# Patient Record
Sex: FEMALE | Race: WHITE | NOT HISPANIC OR LATINO | Employment: PART TIME | URBAN - METROPOLITAN AREA
[De-identification: names, ages, dates, MRNs, and addresses within clinical notes are randomized per-mention and may not be internally consistent; named-entity substitution may affect disease eponyms.]

---

## 2023-08-21 ENCOUNTER — HOSPITAL ENCOUNTER (OUTPATIENT)
Dept: DATA CONVERSION | Facility: HOSPITAL | Age: 63
End: 2023-08-21

## 2023-08-21 DIAGNOSIS — Z12.31 ENCOUNTER FOR SCREENING MAMMOGRAM FOR MALIGNANT NEOPLASM OF BREAST: ICD-10-CM

## 2023-08-22 PROBLEM — R73.9 HYPERGLYCEMIA: Status: ACTIVE | Noted: 2023-08-22

## 2023-08-22 PROBLEM — E55.9 VITAMIN D DEFICIENCY: Status: ACTIVE | Noted: 2023-08-22

## 2023-08-22 PROBLEM — J30.2 SEASONAL ALLERGIES: Status: ACTIVE | Noted: 2023-08-22

## 2023-08-22 PROBLEM — E03.8 SUBCLINICAL HYPOTHYROIDISM: Status: ACTIVE | Noted: 2023-08-22

## 2023-08-22 PROBLEM — E78.2 MIXED HYPERLIPIDEMIA: Status: ACTIVE | Noted: 2023-08-22

## 2023-08-22 PROBLEM — R55 NEAR SYNCOPE: Status: ACTIVE | Noted: 2023-08-22

## 2023-08-22 RX ORDER — FLUTICASONE PROPIONATE 50 MCG
1 SPRAY, SUSPENSION (ML) NASAL DAILY
COMMUNITY

## 2023-08-22 RX ORDER — PRAVASTATIN SODIUM 40 MG/1
1 TABLET ORAL DAILY
COMMUNITY

## 2023-08-22 RX ORDER — TURMERIC ROOT EXTRACT 500 MG
TABLET ORAL
COMMUNITY

## 2023-08-22 RX ORDER — ACETAMINOPHEN 500 MG
1 TABLET ORAL DAILY
COMMUNITY

## 2023-08-22 RX ORDER — LORATADINE 10 MG/1
1 TABLET ORAL DAILY
COMMUNITY

## 2023-08-22 RX ORDER — IBUPROFEN 100 MG/5ML
1 SUSPENSION, ORAL (FINAL DOSE FORM) ORAL DAILY
COMMUNITY

## 2023-10-09 NOTE — PROGRESS NOTES
Subjective   Patient ID: Tabatha Grewal is a 62 y.o. female who presents for yearly exam.    Complaining of vaginal bleeding since yesterday.  Stated symptoms resolved today.  She has been applying flagyl after she saw Dr Liao few weeks ago for pH imbalance.  Denied any foul smelling vaginal discharge.  Complaining of lower abdominal cramps.  Denied any burning micturition, hematuria.    HPI               H/O dyslipidemia-compliant with medications. Denied any medication side effects.           Review of Systems   Constitutional:  Negative for chills, fatigue and unexpected weight change.   HENT:  Negative for postnasal drip, sinus pressure and trouble swallowing.    Respiratory:  Negative for cough, shortness of breath and wheezing.    Cardiovascular:  Negative for chest pain, palpitations and leg swelling.   Gastrointestinal:  Negative for abdominal pain, blood in stool, nausea and vomiting.   Endocrine: Negative for polydipsia, polyphagia and polyuria.   Genitourinary:  Negative for dysuria and frequency.   Musculoskeletal:  Positive for back pain (wearing support brace helps). Negative for myalgias.   Skin:  Negative for rash.   Neurological:  Negative for tremors, seizures and numbness.   Psychiatric/Behavioral:  Negative for behavioral problems.        Objective   /76 (BP Location: Left arm, Patient Position: Sitting, BP Cuff Size: Adult)   Pulse 67   Temp 36.1 °C (97 °F) (Temporal)   Wt 62.6 kg (138 lb)   SpO2 97%   BMI 24.84 kg/m²     Physical Exam  Constitutional:       General: She is not in acute distress.     Appearance: Normal appearance.   HENT:      Head: Normocephalic and atraumatic.      Right Ear: Tympanic membrane normal.      Left Ear: Tympanic membrane normal.   Eyes:      Extraocular Movements: Extraocular movements intact.      Pupils: Pupils are equal, round, and reactive to light.   Cardiovascular:      Rate and Rhythm: Normal rate and regular rhythm.      Heart sounds:  Normal heart sounds. No murmur heard.     No friction rub.   Pulmonary:      Effort: Pulmonary effort is normal.      Breath sounds: Normal breath sounds. No wheezing or rales.   Abdominal:      General: Bowel sounds are normal.      Palpations: Abdomen is soft.      Tenderness: There is no abdominal tenderness. There is no guarding.      Comments: Umbilical hernia+, non tender   Musculoskeletal:         General: Normal range of motion.      Cervical back: Normal range of motion and neck supple.      Right lower leg: No edema.      Left lower leg: No edema.   Skin:     General: Skin is warm and dry.      Findings: No rash.   Neurological:      General: No focal deficit present.      Mental Status: She is alert and oriented to person, place, and time.      Cranial Nerves: No cranial nerve deficit.      Sensory: No sensory deficit.   Psychiatric:         Mood and Affect: Mood normal.         Assessment/Plan       Tabatha was seen today for annual exam.  Diagnoses and all orders for this visit:  Annual physical exam (Primary)  -     CBC and Auto Differential; Future  -     Comprehensive Metabolic Panel; Future  Hyperglycemia  -     Cancel: Hemoglobin A1C; Future  -     Hemoglobin A1C; Standing  Subclinical hypothyroidism  -     TSH with reflex to Free T4 if abnormal; Future  Vitamin D deficiency  -     Vitamin D 25-Hydroxy,Total (for eval of Vitamin D levels); Future  Mixed hyperlipidemia  -     Lipid Panel; Future  Bilateral lower abdominal cramping  -     Urine Culture; Future  -     Urinalysis with Reflex Microscopic; Future  Vaginal bleeding  -     US pelvis transvaginal; Future  Other orders  -     Flu vaccine (IIV4) age 6 months and greater, preservative free     Current Outpatient Medications   Medication Instructions    ascorbic acid (Vitamin C) 1,000 mg tablet 1 tablet, oral, Daily    cholecalciferol (Vitamin D3) 50 mcg (2,000 unit) capsule 1 capsule, oral, Daily    fluticasone (Flonase) 50 mcg/actuation  nasal spray 1 spray, Each Nostril, Daily    loratadine (Claritin) 10 mg tablet 1 tablet, oral, Daily    MULTIVITAMIN ORAL oral    pravastatin (Pravachol) 40 mg tablet 1 tablet, oral, Daily, For 90 days.    turmeric root extract 500 mg tablet oral, As directed.     vitamin B complex (B COMPLEX-VITAMIN B12 ORAL) 1 tablet, oral, Daily, 1000mg.       Blood pressure slightly elevated, advised low-salt diet.  Check blood pressure at home few times a month, if consistently higher than 140/80 advised early follow-up

## 2023-10-10 ENCOUNTER — OFFICE VISIT (OUTPATIENT)
Dept: PRIMARY CARE | Facility: CLINIC | Age: 63
End: 2023-10-10
Payer: COMMERCIAL

## 2023-10-10 ENCOUNTER — LAB (OUTPATIENT)
Dept: LAB | Facility: LAB | Age: 63
End: 2023-10-10
Payer: COMMERCIAL

## 2023-10-10 ENCOUNTER — ANCILLARY PROCEDURE (OUTPATIENT)
Dept: RADIOLOGY | Facility: CLINIC | Age: 63
End: 2023-10-10
Payer: COMMERCIAL

## 2023-10-10 VITALS — WEIGHT: 136.02 LBS | BODY MASS INDEX: 25.03 KG/M2 | HEIGHT: 62 IN

## 2023-10-10 VITALS
TEMPERATURE: 97 F | DIASTOLIC BLOOD PRESSURE: 76 MMHG | HEART RATE: 67 BPM | OXYGEN SATURATION: 97 % | WEIGHT: 138 LBS | SYSTOLIC BLOOD PRESSURE: 142 MMHG | BODY MASS INDEX: 24.84 KG/M2

## 2023-10-10 DIAGNOSIS — E78.2 MIXED HYPERLIPIDEMIA: ICD-10-CM

## 2023-10-10 DIAGNOSIS — E55.9 VITAMIN D DEFICIENCY: ICD-10-CM

## 2023-10-10 DIAGNOSIS — N93.9 VAGINAL BLEEDING: ICD-10-CM

## 2023-10-10 DIAGNOSIS — R10.32 BILATERAL LOWER ABDOMINAL CRAMPING: ICD-10-CM

## 2023-10-10 DIAGNOSIS — Z12.31 ENCOUNTER FOR SCREENING MAMMOGRAM FOR MALIGNANT NEOPLASM OF BREAST: ICD-10-CM

## 2023-10-10 DIAGNOSIS — E03.8 SUBCLINICAL HYPOTHYROIDISM: ICD-10-CM

## 2023-10-10 DIAGNOSIS — R10.31 BILATERAL LOWER ABDOMINAL CRAMPING: ICD-10-CM

## 2023-10-10 DIAGNOSIS — Z00.00 ANNUAL PHYSICAL EXAM: Primary | ICD-10-CM

## 2023-10-10 DIAGNOSIS — R73.9 HYPERGLYCEMIA: ICD-10-CM

## 2023-10-10 DIAGNOSIS — Z00.00 ANNUAL PHYSICAL EXAM: ICD-10-CM

## 2023-10-10 LAB
25(OH)D3 SERPL-MCNC: 66 NG/ML (ref 31–100)
ALBUMIN SERPL-MCNC: 4.6 G/DL (ref 3.5–5)
ALP BLD-CCNC: 54 U/L (ref 35–125)
ALT SERPL-CCNC: 13 U/L (ref 5–40)
ANION GAP SERPL CALC-SCNC: 12 MMOL/L
APPEARANCE UR: CLEAR
AST SERPL-CCNC: 10 U/L (ref 5–40)
BACTERIA #/AREA URNS AUTO: ABNORMAL /HPF
BASOPHILS # BLD AUTO: 0.04 X10*3/UL (ref 0–0.1)
BASOPHILS NFR BLD AUTO: 0.6 %
BILIRUB SERPL-MCNC: 0.4 MG/DL (ref 0.1–1.2)
BILIRUB UR STRIP.AUTO-MCNC: NEGATIVE MG/DL
BUN SERPL-MCNC: 11 MG/DL (ref 8–25)
CALCIUM SERPL-MCNC: 9.7 MG/DL (ref 8.5–10.4)
CHLORIDE SERPL-SCNC: 103 MMOL/L (ref 97–107)
CHOLEST SERPL-MCNC: 194 MG/DL (ref 133–200)
CHOLEST/HDLC SERPL: 4.9 {RATIO}
CO2 SERPL-SCNC: 26 MMOL/L (ref 24–31)
COLOR UR: COLORLESS
CREAT SERPL-MCNC: 0.7 MG/DL (ref 0.4–1.6)
EOSINOPHIL # BLD AUTO: 0.08 X10*3/UL (ref 0–0.7)
EOSINOPHIL NFR BLD AUTO: 1.1 %
ERYTHROCYTE [DISTWIDTH] IN BLOOD BY AUTOMATED COUNT: 12.2 % (ref 11.5–14.5)
EST. AVERAGE GLUCOSE BLD GHB EST-MCNC: 117 MG/DL
GFR SERPL CREATININE-BSD FRML MDRD: >90 ML/MIN/1.73M*2
GLUCOSE SERPL-MCNC: 106 MG/DL (ref 65–99)
GLUCOSE UR STRIP.AUTO-MCNC: NORMAL MG/DL
HBA1C MFR BLD: 5.7 %
HCT VFR BLD AUTO: 42.5 % (ref 36–46)
HDLC SERPL-MCNC: 40 MG/DL
HGB BLD-MCNC: 13.6 G/DL (ref 12–16)
IMM GRANULOCYTES # BLD AUTO: 0.01 X10*3/UL (ref 0–0.7)
IMM GRANULOCYTES NFR BLD AUTO: 0.1 % (ref 0–0.9)
KETONES UR STRIP.AUTO-MCNC: NEGATIVE MG/DL
LDLC SERPL CALC-MCNC: 86 MG/DL (ref 65–130)
LEUKOCYTE ESTERASE UR QL STRIP.AUTO: ABNORMAL
LYMPHOCYTES # BLD AUTO: 2.72 X10*3/UL (ref 1.2–4.8)
LYMPHOCYTES NFR BLD AUTO: 38.6 %
MCH RBC QN AUTO: 29.8 PG (ref 26–34)
MCHC RBC AUTO-ENTMCNC: 32 G/DL (ref 32–36)
MCV RBC AUTO: 93 FL (ref 80–100)
MONOCYTES # BLD AUTO: 0.36 X10*3/UL (ref 0.1–1)
MONOCYTES NFR BLD AUTO: 5.1 %
NEUTROPHILS # BLD AUTO: 3.83 X10*3/UL (ref 1.2–7.7)
NEUTROPHILS NFR BLD AUTO: 54.5 %
NITRITE UR QL STRIP.AUTO: NEGATIVE
NRBC BLD-RTO: 0 /100 WBCS (ref 0–0)
PH UR STRIP.AUTO: 5.5 [PH]
PLATELET # BLD AUTO: 335 X10*3/UL (ref 150–450)
PMV BLD AUTO: 11.6 FL (ref 7.5–11.5)
POTASSIUM SERPL-SCNC: 4.2 MMOL/L (ref 3.4–5.1)
PROT SERPL-MCNC: 6.6 G/DL (ref 5.9–7.9)
PROT UR STRIP.AUTO-MCNC: NEGATIVE MG/DL
RBC # BLD AUTO: 4.57 X10*6/UL (ref 4–5.2)
RBC # UR STRIP.AUTO: NEGATIVE /UL
RBC #/AREA URNS AUTO: ABNORMAL /HPF
SODIUM SERPL-SCNC: 141 MMOL/L (ref 133–145)
SP GR UR STRIP.AUTO: 1
TRIGL SERPL-MCNC: 338 MG/DL (ref 40–150)
TSH SERPL DL<=0.05 MIU/L-ACNC: 2.5 MIU/L (ref 0.27–4.2)
UROBILINOGEN UR STRIP.AUTO-MCNC: NORMAL MG/DL
WBC # BLD AUTO: 7 X10*3/UL (ref 4.4–11.3)
WBC #/AREA URNS AUTO: ABNORMAL /HPF

## 2023-10-10 PROCEDURE — 81001 URINALYSIS AUTO W/SCOPE: CPT

## 2023-10-10 PROCEDURE — 99396 PREV VISIT EST AGE 40-64: CPT | Performed by: INTERNAL MEDICINE

## 2023-10-10 PROCEDURE — 82306 VITAMIN D 25 HYDROXY: CPT

## 2023-10-10 PROCEDURE — 36415 COLL VENOUS BLD VENIPUNCTURE: CPT

## 2023-10-10 PROCEDURE — 80061 LIPID PANEL: CPT

## 2023-10-10 PROCEDURE — 83036 HEMOGLOBIN GLYCOSYLATED A1C: CPT

## 2023-10-10 PROCEDURE — 1036F TOBACCO NON-USER: CPT | Performed by: INTERNAL MEDICINE

## 2023-10-10 PROCEDURE — 90686 IIV4 VACC NO PRSV 0.5 ML IM: CPT | Performed by: INTERNAL MEDICINE

## 2023-10-10 PROCEDURE — 77067 SCR MAMMO BI INCL CAD: CPT | Mod: 50

## 2023-10-10 PROCEDURE — 87086 URINE CULTURE/COLONY COUNT: CPT

## 2023-10-10 PROCEDURE — 84443 ASSAY THYROID STIM HORMONE: CPT

## 2023-10-10 PROCEDURE — 80053 COMPREHEN METABOLIC PANEL: CPT

## 2023-10-10 PROCEDURE — 99213 OFFICE O/P EST LOW 20 MIN: CPT | Performed by: INTERNAL MEDICINE

## 2023-10-10 PROCEDURE — 85025 COMPLETE CBC W/AUTO DIFF WBC: CPT

## 2023-10-10 RX ORDER — NITROFURANTOIN 25; 75 MG/1; MG/1
100 CAPSULE ORAL 2 TIMES DAILY
Qty: 14 CAPSULE | Refills: 0 | Status: SHIPPED | OUTPATIENT
Start: 2023-10-10 | End: 2023-10-17

## 2023-10-10 ASSESSMENT — ENCOUNTER SYMPTOMS
POLYDIPSIA: 0
DYSURIA: 0
FATIGUE: 0
UNEXPECTED WEIGHT CHANGE: 0
BLOOD IN STOOL: 0
CHILLS: 0
PALPITATIONS: 0
POLYPHAGIA: 0
VOMITING: 0
SEIZURES: 0
SHORTNESS OF BREATH: 0
BACK PAIN: 1
MYALGIAS: 0
SINUS PRESSURE: 0
WHEEZING: 0
TREMORS: 0
ABDOMINAL PAIN: 0
TROUBLE SWALLOWING: 0
NAUSEA: 0
NUMBNESS: 0
FREQUENCY: 0
COUGH: 0

## 2023-10-10 ASSESSMENT — PAIN SCALES - GENERAL: PAINLEVEL: 0-NO PAIN

## 2023-10-11 LAB — BACTERIA UR CULT: NORMAL

## 2023-10-17 ENCOUNTER — ANCILLARY PROCEDURE (OUTPATIENT)
Dept: RADIOLOGY | Facility: CLINIC | Age: 63
End: 2023-10-17
Payer: COMMERCIAL

## 2023-10-17 DIAGNOSIS — N93.9 VAGINAL BLEEDING: ICD-10-CM

## 2023-10-17 PROCEDURE — 76830 TRANSVAGINAL US NON-OB: CPT

## 2024-08-20 ENCOUNTER — APPOINTMENT (OUTPATIENT)
Dept: OBSTETRICS AND GYNECOLOGY | Facility: CLINIC | Age: 64
End: 2024-08-20
Payer: COMMERCIAL

## 2024-09-05 ENCOUNTER — OFFICE VISIT (OUTPATIENT)
Dept: OBSTETRICS AND GYNECOLOGY | Facility: CLINIC | Age: 64
End: 2024-09-05
Payer: COMMERCIAL

## 2024-09-05 VITALS
DIASTOLIC BLOOD PRESSURE: 72 MMHG | HEIGHT: 62 IN | BODY MASS INDEX: 25.88 KG/M2 | WEIGHT: 140.6 LBS | SYSTOLIC BLOOD PRESSURE: 130 MMHG

## 2024-09-05 DIAGNOSIS — Z12.31 ENCOUNTER FOR SCREENING MAMMOGRAM FOR MALIGNANT NEOPLASM OF BREAST: ICD-10-CM

## 2024-09-05 DIAGNOSIS — Z01.419 WELL WOMAN EXAM: Primary | ICD-10-CM

## 2024-09-05 DIAGNOSIS — N95.8 GENITOURINARY SYNDROME OF MENOPAUSE: ICD-10-CM

## 2024-09-05 DIAGNOSIS — Z12.11 COLON CANCER SCREENING: ICD-10-CM

## 2024-09-05 DIAGNOSIS — Z12.4 CERVICAL CANCER SCREENING: ICD-10-CM

## 2024-09-05 DIAGNOSIS — N95.2 ATROPHIC VAGINITIS: ICD-10-CM

## 2024-09-05 PROCEDURE — 99396 PREV VISIT EST AGE 40-64: CPT | Performed by: OBSTETRICS & GYNECOLOGY

## 2024-09-05 PROCEDURE — 3008F BODY MASS INDEX DOCD: CPT | Performed by: OBSTETRICS & GYNECOLOGY

## 2024-09-05 RX ORDER — OMEGA-3/DHA/EPA/FISH OIL 300-1000MG
CAPSULE,DELAYED RELEASE (ENTERIC COATED) ORAL
COMMUNITY
Start: 2023-11-01

## 2024-09-05 RX ORDER — CIDER VINEGAR 300 MG
TABLET ORAL
COMMUNITY

## 2024-09-05 ASSESSMENT — LIFESTYLE VARIABLES
HOW MANY STANDARD DRINKS CONTAINING ALCOHOL DO YOU HAVE ON A TYPICAL DAY: 1 OR 2
HOW OFTEN DO YOU HAVE SIX OR MORE DRINKS ON ONE OCCASION: NEVER
AUDIT-C TOTAL SCORE: 2
SKIP TO QUESTIONS 9-10: 1
HOW OFTEN DO YOU HAVE A DRINK CONTAINING ALCOHOL: 2-4 TIMES A MONTH

## 2024-09-05 ASSESSMENT — PATIENT HEALTH QUESTIONNAIRE - PHQ9
1. LITTLE INTEREST OR PLEASURE IN DOING THINGS: NOT AT ALL
SUM OF ALL RESPONSES TO PHQ9 QUESTIONS 1 & 2: 0
2. FEELING DOWN, DEPRESSED OR HOPELESS: NOT AT ALL

## 2024-09-05 ASSESSMENT — ENCOUNTER SYMPTOMS
OCCASIONAL FEELINGS OF UNSTEADINESS: 0
LOSS OF SENSATION IN FEET: 0
DEPRESSION: 0

## 2024-09-05 ASSESSMENT — PAIN SCALES - GENERAL: PAINLEVEL: 0-NO PAIN

## 2024-09-05 NOTE — PROGRESS NOTES
ESTABLISHED ANNUAL GYN VISIT     Patient Name:  Tabatha Grewal  :  1960  MR #:  [id number]  Acct #:  [id number]      ASSESSMENT/PLAN:   1. Well woman exam      2. Encounter for screening mammogram for malignant neoplasm of breast    - BI mammo bilateral screening tomosynthesis; Future    3. Cervical cancer screening  Up to date.    4. Colon cancer screening  Up to date.     5. Genitourinary syndrome of menopause  Recommended women's health probiotic several days a week.    6. Atrophic vaginitis   Recommended twice weekly moisturizing with appropiate therapy ie. Via, Luvena, Johanna Kimberly.    Counseling:  Medication education:  Education:  All new and/or current medications discussed and reviewed including side effects with patient/caregiver, Understanding:  Caregiver/Patient expressed understanding., Adherence:  Barriers to adherence identified and discussed if present,     OB/GYN Preventive:     - Pap smear indicated every 5 years if normal and otherwise low risk - Next Pap smear due Now  - Self breast exam monthly and clinical breast examination yearly discussed - Next Mammogram due next month  - Screening colonoscopy recommended starting age 45, then Q3-10 years depending on testing and family history - Next screen due     - Osteoporosis prevention discussion included vit d3/calcium supplements, weight-bearing exercise - DEXA scan due 67.   - Genitourinary skin hygiene discussed.   - Diet/Weight management discussed.      Chief Complaint:  Annual exam    HPI:  Tabatha Grewal is a 63 y.o. F  No LMP recorded. Patient is postmenopausal. for annual exam.  Pain with intercourse, using moisturizer prn in the morning.    GYNH:   Yes, first onset 15  LMP:  No LMP recorded. Patient is postmenopausal.  Menstrual cycles: Postmenopausal  HPV Vaccine No.  Sexual activity Yes - . [place] Yes - .  Birth control history    Past Medical History:   Diagnosis Date    Diverticulosis     Fainting      Resolved    Goiter     slight goiter with hyperthyroidism uptake    Mixed hyperlipidemia     Pre-diabetes        Past Surgical History:   Procedure Laterality Date    COLONOSCOPY  2016    Normal    DILATION AND CURETTAGE OF UTERUS  1994    OTHER SURGICAL HISTORY      [id number]    TOE SURGERY Right     Arthritis removed    TONSILLECTOMY  1968       Social History     Tobacco Use    Smoking status: Never    Smokeless tobacco: Never   Vaping Use    Vaping status: Never Used   Substance Use Topics    Alcohol use: Not Currently     Alcohol/week: 1.0 standard drink of alcohol     Types: 1 Glasses of wine per week     Comment: maybe a drink once or twice a month    Drug use: Never        Family History   Problem Relation Name Age of Onset    Arthritis Mother mom     Hypertension Mother mom     Other (PMR) Mother mom     Kidney disease Mother mom     Hypertension Father father     Arthritis Father father     No Known Problems Sister      No Known Problems Brother      Heart defect Daughter angeline         surgery/valve repair-2020    Miscarriages / Stillbirths Daughter angeline     No Known Problems Daughter      Asthma Maternal Grandmother grandma myah     No Known Problems Maternal Grandfather      Stomach cancer Paternal Grandmother grandma Mckosky     Heart disease Paternal Grandmother grandma Mckosky     Arthritis Paternal Grandfather grandfpa McKosky     Stroke Paternal Grandfather grandfpa McKosky     Genetic Disorder Neg Hx          genetic risk assessment 2021       OB History          3    Para   2    Term   2       0    AB   1    Living   2         SAB   1    IAB   0    Ectopic   0    Multiple   0    Live Births   2                  Prior to Admission medications    Medication Sig Start Date End Date Taking? Authorizing Provider   apple cider vinegar 300 mg tablet Take by mouth.   Yes Historical Provider, MD   ascorbic acid (Vitamin C) 1,000 mg tablet Take 1 tablet (1,000 mg) by mouth once  "daily.   Yes Historical Provider, MD   calcium polycarbophiL (Fibercon) 625 mg tablet Take by mouth.   Yes Historical Provider, MD   cholecalciferol (Vitamin D3) 50 mcg (2,000 unit) capsule Take 1 capsule (50 mcg) by mouth early in the morning..   Yes Historical Provider, MD   fish,bora,flax oils-om3,6,9no1 (Triple Omega 3-6-9) 400-400-400 mg capsule  11/1/23  Yes Historical Provider, MD   fluticasone (Flonase) 50 mcg/actuation nasal spray Administer 1 spray into each nostril once daily.   Yes Historical Provider, MD   loratadine (Claritin) 10 mg tablet Take 1 tablet (10 mg) by mouth once daily.   Yes Historical Provider, MD   MULTIVITAMIN ORAL Take by mouth.   Yes Historical Provider, MD   pravastatin (Pravachol) 40 mg tablet Take 1 tablet (40 mg) by mouth once daily. For 90 days.   Yes Historical Provider, MD   turmeric root extract 500 mg tablet Take by mouth. As directed.   Yes Historical Provider, MD   vitamin B complex (B COMPLEX-VITAMIN B12 ORAL) Take 1 tablet by mouth once daily. 1000mg.   Yes Historical Provider, MD       Allergies   Allergen Reactions    Penicillins Hives       ROS:   [facility] - WOMEN ONLY:          Breast Lump No acute changes.  Hot Flashes no.  Painful [place] no.  Vaginal Discharge no.       [facility] - ROS Update:          Unexplained Weight Change no.  Pain anywhere in your body no.  Black or bloody stools no.  Problems with urination no.  Rashes or sores no.  Sexual problems no.  Depression or anxiety problems no.  Do you feel threatened by anyone No.      OBJECTIVE:   /72   Ht 1.575 m (5' 2\")   Wt 63.8 kg (140 lb 9.6 oz)   BMI 25.72 kg/m²   Body mass index is 25.72 kg/m².     Physical Exam  GENERAL:   General Appearance:  well-developed, well-nourished, no functional handicap, well-groomed.  Hygiene:  good.  Ill-appearance:  none.  Mental Status:  alert and oriented.  Speech:  clear.  Eye contact:  normal.  Appears stated age:  yes.    LUNGS:  CTAB.  Effort:  no respiratory " distress.    HEART:  HRRR with S1/S2 w/o M/C/R  BREASTS: General:  no masses, no tenderness, no skin changes, no nipple abnormality, and no axillary lymphadenopathy.   ABDOMEN: Tenderness:  none.  Distention:  none.   GENITOURINARY - FEMALE: Bladder:  normal.  Pelvic support defects:  mild  External genitalia:  Normal.  Urethra:  Normal.  Vagina: hypoestrogenic, dry.  Cervix/ cuff:  No CMT.  Uterus:  normal size/shape/consistency, non tender.  [place]nexa:  normal , non tender.   DERMATOLOGY:  Skin:  aging normally. No acute lesions.  EXTREMITIES: Normal:  no anomalies.  Edema:  none.    NEUROLOGICAL: Orientation:  alert and oriented x 3  PSYCHOLOGY: Affect:  appropriate.  Mood:  pleasant.    Labs reviewed: yes    Imaging reviewed: yes      Note: This dictation was generated using Dragon voice recognition software. Please excuse any grammatical or spelling errors that may have occurred using the system.

## 2024-10-11 ENCOUNTER — HOSPITAL ENCOUNTER (OUTPATIENT)
Dept: RADIOLOGY | Facility: HOSPITAL | Age: 64
Discharge: HOME | End: 2024-10-11
Payer: COMMERCIAL

## 2024-10-11 VITALS — WEIGHT: 138 LBS | BODY MASS INDEX: 25.4 KG/M2 | HEIGHT: 62 IN

## 2024-10-11 DIAGNOSIS — Z12.31 ENCOUNTER FOR SCREENING MAMMOGRAM FOR MALIGNANT NEOPLASM OF BREAST: ICD-10-CM

## 2024-10-11 PROCEDURE — 77067 SCR MAMMO BI INCL CAD: CPT

## 2024-10-15 ENCOUNTER — APPOINTMENT (OUTPATIENT)
Dept: PRIMARY CARE | Facility: CLINIC | Age: 64
End: 2024-10-15
Payer: COMMERCIAL

## 2024-10-17 ENCOUNTER — APPOINTMENT (OUTPATIENT)
Dept: RADIOLOGY | Facility: HOSPITAL | Age: 64
End: 2024-10-17
Payer: COMMERCIAL

## 2025-05-19 ENCOUNTER — TELEPHONE (OUTPATIENT)
Dept: PRIMARY CARE | Facility: CLINIC | Age: 65
End: 2025-05-19

## 2025-05-19 ENCOUNTER — APPOINTMENT (OUTPATIENT)
Dept: PRIMARY CARE | Facility: CLINIC | Age: 65
End: 2025-05-19
Payer: COMMERCIAL

## 2025-05-19 VITALS
SYSTOLIC BLOOD PRESSURE: 130 MMHG | HEIGHT: 64 IN | HEART RATE: 78 BPM | TEMPERATURE: 98.3 F | DIASTOLIC BLOOD PRESSURE: 82 MMHG | BODY MASS INDEX: 23.9 KG/M2 | WEIGHT: 140 LBS | OXYGEN SATURATION: 98 %

## 2025-05-19 DIAGNOSIS — L60.8 LONGITUDINAL MELANONYCHIA: ICD-10-CM

## 2025-05-19 DIAGNOSIS — E78.2 MIXED HYPERLIPIDEMIA: ICD-10-CM

## 2025-05-19 DIAGNOSIS — Z13.6 SCREENING FOR HEART DISEASE: ICD-10-CM

## 2025-05-19 DIAGNOSIS — Z00.00 GENERAL MEDICAL EXAM: ICD-10-CM

## 2025-05-19 DIAGNOSIS — R73.01 IFG (IMPAIRED FASTING GLUCOSE): ICD-10-CM

## 2025-05-19 DIAGNOSIS — Z00.00 ANNUAL PHYSICAL EXAM: Primary | ICD-10-CM

## 2025-05-19 DIAGNOSIS — E03.8 SUBCLINICAL HYPOTHYROIDISM: ICD-10-CM

## 2025-05-19 DIAGNOSIS — E55.9 VITAMIN D DEFICIENCY: ICD-10-CM

## 2025-05-19 PROCEDURE — 99213 OFFICE O/P EST LOW 20 MIN: CPT | Performed by: STUDENT IN AN ORGANIZED HEALTH CARE EDUCATION/TRAINING PROGRAM

## 2025-05-19 PROCEDURE — 99396 PREV VISIT EST AGE 40-64: CPT | Performed by: STUDENT IN AN ORGANIZED HEALTH CARE EDUCATION/TRAINING PROGRAM

## 2025-05-19 PROCEDURE — 3008F BODY MASS INDEX DOCD: CPT | Performed by: STUDENT IN AN ORGANIZED HEALTH CARE EDUCATION/TRAINING PROGRAM

## 2025-05-19 PROCEDURE — 93000 ELECTROCARDIOGRAM COMPLETE: CPT | Performed by: STUDENT IN AN ORGANIZED HEALTH CARE EDUCATION/TRAINING PROGRAM

## 2025-05-19 ASSESSMENT — ENCOUNTER SYMPTOMS
DEPRESSION: 0
OCCASIONAL FEELINGS OF UNSTEADINESS: 0
LOSS OF SENSATION IN FEET: 0

## 2025-05-19 ASSESSMENT — PATIENT HEALTH QUESTIONNAIRE - PHQ9
1. LITTLE INTEREST OR PLEASURE IN DOING THINGS: NOT AT ALL
2. FEELING DOWN, DEPRESSED OR HOPELESS: NOT AT ALL
SUM OF ALL RESPONSES TO PHQ9 QUESTIONS 1 AND 2: 0

## 2025-05-19 ASSESSMENT — PAIN SCALES - GENERAL: PAINLEVEL_OUTOF10: 0-NO PAIN

## 2025-05-19 NOTE — PROGRESS NOTES
Subjective   Patient ID: Tabatha Grewal is a 64 y.o. female who presents for Annual Exam (No recent labs/Mammogram 10/2024/EKG- needs /[id number]/Pap- - sees obgyn/Worried about a humming or vibration inside of body. Pain in back and leg. ). Prior pt of Dr. Baugh.     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 Villalba   Postmenopausal: ***  LMP: ***  Sexually active: ***   BCM: ***  HRT use: ***  H/o Gyn Surgery ***  Last Pap: ***   History of abnormal pap: ***    Last mammogram:    History of abnormal mammogram: ***  Bone Density: ***  -Calcium intake adequate. Vitamin D intake at 800-1000 IU/day. ***  OB History          3    Para   2    Term   2       0    AB   1    Living   2         SAB   1    IAB   0    Ectopic   0    Multiple   0    Live Births   2                 Current Outpatient Medications   Medication Instructions    apple cider vinegar 300 mg tablet Take by mouth.    ascorbic acid (Vitamin C) 1,000 mg tablet 1 tablet, Daily    calcium polycarbophiL (Fibercon) 625 mg tablet Take by mouth.    cholecalciferol (Vitamin D3) 50 mcg (2,000 unit) capsule 1 capsule, Daily    fish,bora,flax oils-om3,6,9no1 (Triple Omega 3-6-9) 400-400-400 mg capsule     fluticasone (Flonase) 50 mcg/actuation nasal spray 1 spray, Daily    loratadine (Claritin) 10 mg tablet 1 tablet, Daily    MULTIVITAMIN ORAL Take by mouth.    pravastatin (Pravachol) 40 mg tablet 1 tablet, Daily    turmeric root extract 500 mg tablet Take by mouth. As directed.    vitamin B  "complex (B COMPLEX-VITAMIN B12 ORAL) 1 tablet, Daily     Allergies[1]    Immunization History   Administered Date(s) Administered    COVID-19, mRNA, LNP-S, PF, 30 mcg/0.3 mL dose 03/13/2021    Flu vaccine (IIV4), preservative free *Check age/dose* 10/03/2018, 10/03/2019, 10/15/2019, 09/11/2020, 09/11/2021, 10/10/2023    Flu vaccine, quadrivalent, no egg protein, age 6 month or greater (FLUCELVAX) 10/26/2017, 10/06/2022    Hepatitis B vaccine, 19 yrs and under (RECOMBIVAX, ENGERIX) 09/24/2002, 11/24/2002, 08/29/2003    Influenza, Unspecified 09/21/2024    Influenza, injectable, MDCK, quadrivalent 10/06/2022    Influenza, injectable, quadrivalent 10/30/2017, 09/17/2018, 10/01/2019, 09/11/2020    Influenza, seasonal, injectable 10/17/2015    Pfizer COVID-19 vaccine, bivalent, age 12 years and older (30 mcg/0.3 mL) 10/14/2022    Pfizer Gray Cap SARS-CoV-2 04/21/2022    Pfizer Purple Cap SARS-CoV-2 04/03/2021, 11/21/2021    Tdap vaccine, age 7 year and older (BOOSTRIX, ADACEL) 05/14/2015, 02/04/2020    Zoster vaccine, recombinant, adult (SHINGRIX) 12/07/2019, 05/23/2020     Surgical History[2]  Family History[3]  Social History[4]    Review of Systems    Objective   Visit Vitals  /82 (BP Location: Left arm, Patient Position: Sitting, BP Cuff Size: Adult)   Pulse 78   Temp 36.8 °C (98.3 °F) (Temporal)   Ht 1.626 m (5' 4\")   Wt 63.5 kg (140 lb)   SpO2 98%   BMI 24.03 kg/m²   OB Status Postmenopausal   Smoking Status Never   BSA 1.69 m²       Physical Exam  Two thumbs rt index    Problem List Items Addressed This Visit    None  Visit Diagnoses         Screening for heart disease        Relevant Orders    ECG 12 Lead          {Assess/Plan SmartLinks (Optional) - DX aware test:64321}         [1]   Allergies  Allergen Reactions    Penicillins Hives   [2]   Past Surgical History:  Procedure Laterality Date    COLONOSCOPY  2016    Normal    DILATION AND CURETTAGE OF UTERUS  1994    OTHER SURGICAL HISTORY      [id number]    " TOE SURGERY Right     Arthritis removed    TONSILLECTOMY  1968   [3]   Family History  Problem Relation Name Age of Onset    Arthritis Mother mom     Hypertension Mother mom     Other (PMR) Mother mom     Kidney disease Mother mom     Hypertension Father father     Arthritis Father father     No Known Problems Sister      No Known Problems Brother      Heart defect Daughter angeline         surgery/valve repair-07/24/2020    Miscarriages / Stillbirths Daughter angeline     No Known Problems Daughter      Asthma Maternal Grandmother grandma myah     No Known Problems Maternal Grandfather      Stomach cancer Paternal Grandmother grandma Mckosky     Heart disease Paternal Grandmother grandma Mckosky     Arthritis Paternal Grandfather grandfpa McKosky     Stroke Paternal Grandfather grandfpa McKosky     Genetic Disorder Neg Hx          genetic risk assessment 07/26/2021   [4]   Social History  Tobacco Use    Smoking status: Never    Smokeless tobacco: Never   Vaping Use    Vaping status: Never Used   Substance Use Topics    Alcohol use: Not Currently     Alcohol/week: 1.0 standard drink of alcohol     Types: 1 Glasses of wine per week     Comment: ocassionally    Drug use: Never      Future  - Tsh With Reflex To Free T4 If Abnormal; Future  - CBC and Auto Differential  - Tsh With Reflex To Free T4 If Abnormal    4. IFG (impaired fasting glucose)  Update labs.   - Comprehensive Metabolic Panel; Future  - Hemoglobin A1C; Future  - CBC and Auto Differential; Future  - Comprehensive Metabolic Panel  - Hemoglobin A1C  - CBC and Auto Differential    5. Screening for heart disease  - ECG 12 Lead    6. Longitudinal melanonychia  Discussed likely benign nature of this nail finding. Unlikely to be a melanoma based on appearance and multiple nails affected. Will monitor. Will be planning to see dermatology for skin check and will have assessed/            [1]   Allergies  Allergen Reactions    Penicillins Hives   [2]   Past Surgical History:  Procedure Laterality Date    COLONOSCOPY  2016    Normal    DILATION AND CURETTAGE OF UTERUS  1994    OTHER SURGICAL HISTORY      [id number]    TOE SURGERY Right     Arthritis removed    TONSILLECTOMY  1968   [3]   Family History  Problem Relation Name Age of Onset    Arthritis Mother mom     Hypertension Mother mom     Other (PMR) Mother mom     Kidney disease Mother mom     Hypertension Father father     Arthritis Father father     No Known Problems Sister      No Known Problems Brother      Heart defect Daughter angeline         surgery/valve repair-07/24/2020    Miscarriages / Stillbirths Daughter angeline     No Known Problems Daughter      Asthma Maternal Grandmother grandma myah     No Known Problems Maternal Grandfather      Stomach cancer Paternal Grandmother grandma Mckosky     Heart disease Paternal Grandmother grandma Mckosky     Arthritis Paternal Grandfather grandfpa McKosky     Stroke Paternal Grandfather grandfpa McKosky     Genetic Disorder Neg Hx          genetic risk assessment 07/26/2021   [4]   Social History  Tobacco Use    Smoking status: Never    Smokeless tobacco: Never   Vaping Use    Vaping status: Never Used   Substance Use Topics    Alcohol use:  Not Currently     Alcohol/week: 1.0 standard drink of alcohol     Types: 1 Glasses of wine per week     Comment: ocassionally    Drug use: Never

## 2025-05-19 NOTE — PATIENT INSTRUCTIONS
Your orders for blood work have been placed.  If you would like to schedule an appointment for your labs, you can do so on-line at [email or web address] or call [phone].  They do take walk-in patients also if you prefer.

## 2025-05-20 ENCOUNTER — PATIENT MESSAGE (OUTPATIENT)
Dept: PRIMARY CARE | Facility: CLINIC | Age: 65
End: 2025-05-20
Payer: COMMERCIAL

## 2025-05-20 DIAGNOSIS — E78.2 MIXED HYPERLIPIDEMIA: Primary | ICD-10-CM

## 2025-05-21 RX ORDER — PRAVASTATIN SODIUM 40 MG/1
40 TABLET ORAL DAILY
Qty: 90 TABLET | Refills: 2 | Status: SHIPPED | OUTPATIENT
Start: 2025-05-21

## 2025-06-02 ASSESSMENT — ENCOUNTER SYMPTOMS
DIARRHEA: 0
HEADACHES: 0
DIFFICULTY URINATING: 0
BLOOD IN STOOL: 0
CONSTIPATION: 0
WHEEZING: 0
CHILLS: 0
DIZZINESS: 0
WEAKNESS: 0
COUGH: 0
CHEST TIGHTNESS: 0
ABDOMINAL PAIN: 0
SHORTNESS OF BREATH: 0
FEVER: 0
TROUBLE SWALLOWING: 0
PALPITATIONS: 0
LIGHT-HEADEDNESS: 0

## 2025-06-07 LAB
ALBUMIN SERPL-MCNC: 4.7 G/DL (ref 3.6–5.1)
ALP SERPL-CCNC: 36 U/L (ref 37–153)
ALT SERPL-CCNC: 8 U/L (ref 6–29)
ANION GAP SERPL CALCULATED.4IONS-SCNC: 8 MMOL/L (CALC) (ref 7–17)
AST SERPL-CCNC: 10 U/L (ref 10–35)
BASOPHILS # BLD AUTO: 40 CELLS/UL (ref 0–200)
BASOPHILS NFR BLD AUTO: 0.6 %
BILIRUB SERPL-MCNC: 0.6 MG/DL (ref 0.2–1.2)
BUN SERPL-MCNC: 14 MG/DL (ref 7–25)
CALCIUM SERPL-MCNC: 9.7 MG/DL (ref 8.6–10.4)
CHLORIDE SERPL-SCNC: 101 MMOL/L (ref 98–110)
CHOLEST SERPL-MCNC: 190 MG/DL
CHOLEST/HDLC SERPL: 3.8 (CALC)
CO2 SERPL-SCNC: 27 MMOL/L (ref 20–32)
CREAT SERPL-MCNC: 0.51 MG/DL (ref 0.5–1.05)
EGFRCR SERPLBLD CKD-EPI 2021: 105 ML/MIN/1.73M2
EOSINOPHIL # BLD AUTO: 141 CELLS/UL (ref 15–500)
EOSINOPHIL NFR BLD AUTO: 2.1 %
ERYTHROCYTE [DISTWIDTH] IN BLOOD BY AUTOMATED COUNT: 12.1 % (ref 11–15)
EST. AVERAGE GLUCOSE BLD GHB EST-MCNC: 114 MG/DL
EST. AVERAGE GLUCOSE BLD GHB EST-SCNC: 6.3 MMOL/L
GLUCOSE SERPL-MCNC: 101 MG/DL (ref 65–99)
HBA1C MFR BLD: 5.6 %
HCT VFR BLD AUTO: 41.1 % (ref 35–45)
HDLC SERPL-MCNC: 50 MG/DL
HGB BLD-MCNC: 13.1 G/DL (ref 11.7–15.5)
LDLC SERPL CALC-MCNC: 110 MG/DL (CALC)
LYMPHOCYTES # BLD AUTO: 2660 CELLS/UL (ref 850–3900)
LYMPHOCYTES NFR BLD AUTO: 39.7 %
MCH RBC QN AUTO: 29.7 PG (ref 27–33)
MCHC RBC AUTO-ENTMCNC: 31.9 G/DL (ref 32–36)
MCV RBC AUTO: 93.2 FL (ref 80–100)
MONOCYTES # BLD AUTO: 469 CELLS/UL (ref 200–950)
MONOCYTES NFR BLD AUTO: 7 %
NEUTROPHILS # BLD AUTO: 3390 CELLS/UL (ref 1500–7800)
NEUTROPHILS NFR BLD AUTO: 50.6 %
NONHDLC SERPL-MCNC: 140 MG/DL (CALC)
PLATELET # BLD AUTO: 391 THOUSAND/UL (ref 140–400)
PMV BLD REES-ECKER: 10.5 FL (ref 7.5–12.5)
POTASSIUM SERPL-SCNC: 4.4 MMOL/L (ref 3.5–5.3)
PROT SERPL-MCNC: 6.7 G/DL (ref 6.1–8.1)
RBC # BLD AUTO: 4.41 MILLION/UL (ref 3.8–5.1)
SODIUM SERPL-SCNC: 136 MMOL/L (ref 135–146)
TRIGL SERPL-MCNC: 182 MG/DL
TSH SERPL-ACNC: 2.93 MIU/L (ref 0.4–4.5)
WBC # BLD AUTO: 6.7 THOUSAND/UL (ref 3.8–10.8)

## 2026-05-20 ENCOUNTER — APPOINTMENT (OUTPATIENT)
Dept: PRIMARY CARE | Facility: CLINIC | Age: 66
End: 2026-05-20
Payer: COMMERCIAL

## 2026-06-15 ENCOUNTER — APPOINTMENT (OUTPATIENT)
Dept: PRIMARY CARE | Facility: CLINIC | Age: 66
End: 2026-06-15
Payer: COMMERCIAL